# Patient Record
(demographics unavailable — no encounter records)

---

## 2025-02-06 NOTE — ASSESSMENT
[FreeTextEntry1] : The patient had severe 3 vessel CAD . S/P CABG LIMA  to LAD SVG to OM and Free Radial graft to PDA .. Cardiac MRI showed no Late MELE . EF was 43 % but this may be partially present as a result of LBBB conduction on the test .  Repeat echo showed EF 45% . He has had CP which is atypical . Etiology is uncertain and suspect it is more musculoskeletal rather than ischemic but this cannot be ruled out . It occurs at rest and exertion and is relatively brief .

## 2025-02-06 NOTE — CARDIOLOGY SUMMARY
[___] : [unfilled] [de-identified] : 6- NSR LAD LBBB  4- NSR LBBB PVC's in bigeminal pattern  5- SB LBBB PVC's  2- NSR LBBB PVC's  [de-identified] : 5-2-2022 EPATCH 10% PVC burden . AIVR  3- Avg HR 63 8% PVC burden 3 beat VT  [de-identified] : 8- Lexiscan stress test No ischemia . EF 55%  [de-identified] : 5- Moderate LV dysfunction  6- Moderate LV dysfunction EF 43%  Trace MR Trace TGR 1-7-2025 EF 45% Trace MR Trace TR Mild dilitation of the ascedning aorta 3.9 cm     [de-identified] : 1-2023 Cardiac MRI EF 43% suspected that some of the LV dysfunction on the test was from LBBB consduciton  [de-identified] : 5-2021 3 vessel CAD 80% LAD 80% p[earl % occluded RCA  [de-identified] : 5- LIMA to LAD , SVG to OM and Radial artery graft to RCA/PDA

## 2025-02-06 NOTE — HISTORY OF PRESENT ILLNESS
[FreeTextEntry1] : The patient again has been feeling well. No chest pain or SOB . Cardiac MRI showed EF 43% some of which was from LBBB conduction . The patient has had CABG . The patient has had no chest pain or SOB Overall he is feeling well. The patietn has not had lightheadedness of dizziness . He has been feeling well overall.

## 2025-02-06 NOTE — REASON FOR VISIT
[Hyperlipidemia] : hyperlipidemia [Hypertension] : hypertension [Coronary Artery Disease] : coronary artery disease [Consultation] : a consultation regarding [Chest Pain] : chest pain [Dyspnea] : dyspnea [FreeTextEntry3] :

## 2025-06-27 NOTE — CARDIOLOGY SUMMARY
[___] : [unfilled] [de-identified] : 6- NSR LAD LBBB  4- NSR LBBB PVC's in bigeminal pattern  5- SB LBBB PVC's  2- NSR LBBB PVC's  [de-identified] : 5-2-2022 EPATCH 10% PVC burden . AIVR  3- Avg HR 63 8% PVC burden 3 beat VT  [de-identified] : 8- Lexiscan stress test No ischemia . EF 55%  6-2025 Lexiscan stress test no ischemia  [de-identified] : 5- Moderate LV dysfunction  6- Moderate LV dysfunction EF 43%  Trace MR Trace TGR 1-7-2025 EF 45% Trace MR Trace TR Mild dilitation of the ascedning aorta 3.9 cm      [de-identified] : 1-2023 Cardiac MRI EF 43% suspected that some of the LV dysfunction on the test was from LBBB consduciton  [de-identified] : 5-2021 3 vessel CAD 80% LAD 80% p[earl % occluded RCA  [de-identified] : 5- LIMA to LAD , SVG to OM and Radial artery graft to RCA/PDA

## 2025-06-27 NOTE — HISTORY OF PRESENT ILLNESS
[FreeTextEntry1] : The patient again has been feeling well. No chest pain or SOB . Cardiac MRI showed EF 43% some of which was from LBBB conduction . The patient has had CABG . The patient has had no chest pain or SOB Overall he is feeling well. The patietn has not had lightheadedness of dizziness . He has been feeling well overall.  Nuclear stress test showed no ischemia Echo showed mild LV dysfucntion . No CP

## 2025-06-27 NOTE — ASSESSMENT
[FreeTextEntry1] : The patient had severe 3 vessel CAD . S/P CABG LIMA  to LAD SVG to OM and Free Radial graft to PDA .. Cardiac MRI showed no Late MELE . EF was 43 % but this may be partially present as a result of LBBB conduction on the test .  Repeat echo showed EF 45% . He has had CP which is atypical . Etiology is uncertain and suspect it is more musculoskeletal rather than ischemic but this cannot be ruled out . It occurs at rest and exertion and is relatively brief   6- The patient had nuclear stress test which showe No ischemia  No CP . LDL is at goal  He is not smoking